# Patient Record
Sex: MALE | Race: OTHER | HISPANIC OR LATINO | URBAN - METROPOLITAN AREA
[De-identification: names, ages, dates, MRNs, and addresses within clinical notes are randomized per-mention and may not be internally consistent; named-entity substitution may affect disease eponyms.]

---

## 2023-03-18 ENCOUNTER — EMERGENCY (EMERGENCY)
Facility: HOSPITAL | Age: 48
LOS: 1 days | Discharge: HOME | End: 2023-03-18
Attending: EMERGENCY MEDICINE | Admitting: EMERGENCY MEDICINE
Payer: MEDICAID

## 2023-03-18 ENCOUNTER — EMERGENCY (EMERGENCY)
Facility: HOSPITAL | Age: 48
LOS: 1 days | Discharge: HOME | End: 2023-03-18
Attending: INTERNAL MEDICINE | Admitting: INTERNAL MEDICINE
Payer: MEDICAID

## 2023-03-18 VITALS
DIASTOLIC BLOOD PRESSURE: 128 MMHG | SYSTOLIC BLOOD PRESSURE: 221 MMHG | WEIGHT: 179.9 LBS | HEART RATE: 119 BPM | HEIGHT: 66 IN | RESPIRATION RATE: 18 BRPM | OXYGEN SATURATION: 99 % | TEMPERATURE: 98 F

## 2023-03-18 VITALS
RESPIRATION RATE: 15 BRPM | OXYGEN SATURATION: 98 % | DIASTOLIC BLOOD PRESSURE: 109 MMHG | HEART RATE: 90 BPM | SYSTOLIC BLOOD PRESSURE: 167 MMHG

## 2023-03-18 VITALS
OXYGEN SATURATION: 97 % | HEIGHT: 66 IN | HEART RATE: 92 BPM | WEIGHT: 179.9 LBS | RESPIRATION RATE: 19 BRPM | SYSTOLIC BLOOD PRESSURE: 200 MMHG | DIASTOLIC BLOOD PRESSURE: 135 MMHG | TEMPERATURE: 98 F

## 2023-03-18 LAB
ALBUMIN SERPL ELPH-MCNC: 4.3 G/DL — SIGNIFICANT CHANGE UP (ref 3.3–5)
ALP SERPL-CCNC: 117 U/L — SIGNIFICANT CHANGE UP (ref 40–120)
ALT FLD-CCNC: 29 U/L — SIGNIFICANT CHANGE UP (ref 10–45)
ANION GAP SERPL CALC-SCNC: 10 MMOL/L — SIGNIFICANT CHANGE UP (ref 5–17)
AST SERPL-CCNC: 34 U/L — SIGNIFICANT CHANGE UP (ref 10–40)
BASOPHILS # BLD AUTO: 0.07 K/UL — SIGNIFICANT CHANGE UP (ref 0–0.2)
BASOPHILS NFR BLD AUTO: 0.7 % — SIGNIFICANT CHANGE UP (ref 0–2)
BILIRUB SERPL-MCNC: 0.6 MG/DL — SIGNIFICANT CHANGE UP (ref 0.2–1.2)
BUN SERPL-MCNC: 7 MG/DL — SIGNIFICANT CHANGE UP (ref 7–23)
CALCIUM SERPL-MCNC: 9 MG/DL — SIGNIFICANT CHANGE UP (ref 8.4–10.5)
CHLORIDE SERPL-SCNC: 101 MMOL/L — SIGNIFICANT CHANGE UP (ref 96–108)
CO2 SERPL-SCNC: 29 MMOL/L — SIGNIFICANT CHANGE UP (ref 22–31)
CREAT SERPL-MCNC: 1.05 MG/DL — SIGNIFICANT CHANGE UP (ref 0.5–1.3)
EGFR: 87 ML/MIN/1.73M2 — SIGNIFICANT CHANGE UP
EOSINOPHIL # BLD AUTO: 0.02 K/UL — SIGNIFICANT CHANGE UP (ref 0–0.5)
EOSINOPHIL NFR BLD AUTO: 0.2 % — SIGNIFICANT CHANGE UP (ref 0–6)
GLUCOSE SERPL-MCNC: 119 MG/DL — HIGH (ref 70–99)
HCT VFR BLD CALC: 43.9 % — SIGNIFICANT CHANGE UP (ref 39–50)
HGB BLD-MCNC: 15.8 G/DL — SIGNIFICANT CHANGE UP (ref 13–17)
IMM GRANULOCYTES NFR BLD AUTO: 0.4 % — SIGNIFICANT CHANGE UP (ref 0–0.9)
LYMPHOCYTES # BLD AUTO: 1.74 K/UL — SIGNIFICANT CHANGE UP (ref 1–3.3)
LYMPHOCYTES # BLD AUTO: 17.4 % — SIGNIFICANT CHANGE UP (ref 13–44)
MCHC RBC-ENTMCNC: 31.2 PG — SIGNIFICANT CHANGE UP (ref 27–34)
MCHC RBC-ENTMCNC: 36 GM/DL — SIGNIFICANT CHANGE UP (ref 32–36)
MCV RBC AUTO: 86.6 FL — SIGNIFICANT CHANGE UP (ref 80–100)
MONOCYTES # BLD AUTO: 0.52 K/UL — SIGNIFICANT CHANGE UP (ref 0–0.9)
MONOCYTES NFR BLD AUTO: 5.2 % — SIGNIFICANT CHANGE UP (ref 2–14)
NEUTROPHILS # BLD AUTO: 7.63 K/UL — HIGH (ref 1.8–7.4)
NEUTROPHILS NFR BLD AUTO: 76.1 % — SIGNIFICANT CHANGE UP (ref 43–77)
NRBC # BLD: 0 /100 WBCS — SIGNIFICANT CHANGE UP (ref 0–0)
PLATELET # BLD AUTO: 317 K/UL — SIGNIFICANT CHANGE UP (ref 150–400)
POTASSIUM SERPL-MCNC: 3.2 MMOL/L — LOW (ref 3.5–5.3)
POTASSIUM SERPL-SCNC: 3.2 MMOL/L — LOW (ref 3.5–5.3)
PROT SERPL-MCNC: 8.4 G/DL — HIGH (ref 6–8.3)
RBC # BLD: 5.07 M/UL — SIGNIFICANT CHANGE UP (ref 4.2–5.8)
RBC # FLD: 13 % — SIGNIFICANT CHANGE UP (ref 10.3–14.5)
SODIUM SERPL-SCNC: 140 MMOL/L — SIGNIFICANT CHANGE UP (ref 135–145)
TROPONIN I, HIGH SENSITIVITY RESULT: 23.9 NG/L — SIGNIFICANT CHANGE UP
TROPONIN I, HIGH SENSITIVITY RESULT: 29.2 NG/L — SIGNIFICANT CHANGE UP
WBC # BLD: 10.02 K/UL — SIGNIFICANT CHANGE UP (ref 3.8–10.5)
WBC # FLD AUTO: 10.02 K/UL — SIGNIFICANT CHANGE UP (ref 3.8–10.5)

## 2023-03-18 PROCEDURE — 96374 THER/PROPH/DIAG INJ IV PUSH: CPT

## 2023-03-18 PROCEDURE — 36415 COLL VENOUS BLD VENIPUNCTURE: CPT

## 2023-03-18 PROCEDURE — 93010 ELECTROCARDIOGRAM REPORT: CPT

## 2023-03-18 PROCEDURE — 99053 MED SERV 10PM-8AM 24 HR FAC: CPT

## 2023-03-18 PROCEDURE — 85025 COMPLETE CBC W/AUTO DIFF WBC: CPT

## 2023-03-18 PROCEDURE — 93005 ELECTROCARDIOGRAM TRACING: CPT

## 2023-03-18 PROCEDURE — 84484 ASSAY OF TROPONIN QUANT: CPT

## 2023-03-18 PROCEDURE — 96376 TX/PRO/DX INJ SAME DRUG ADON: CPT

## 2023-03-18 PROCEDURE — 99284 EMERGENCY DEPT VISIT MOD MDM: CPT | Mod: 25

## 2023-03-18 PROCEDURE — 99284 EMERGENCY DEPT VISIT MOD MDM: CPT

## 2023-03-18 PROCEDURE — 99285 EMERGENCY DEPT VISIT HI MDM: CPT

## 2023-03-18 PROCEDURE — 80053 COMPREHEN METABOLIC PANEL: CPT

## 2023-03-18 RX ORDER — AMLODIPINE BESYLATE 2.5 MG/1
5 TABLET ORAL ONCE
Refills: 0 | Status: COMPLETED | OUTPATIENT
Start: 2023-03-18 | End: 2023-03-18

## 2023-03-18 RX ORDER — AMLODIPINE BESYLATE 2.5 MG/1
1 TABLET ORAL
Qty: 30 | Refills: 0
Start: 2023-03-18 | End: 2023-04-16

## 2023-03-18 RX ORDER — LABETALOL HCL 100 MG
10 TABLET ORAL ONCE
Refills: 0 | Status: COMPLETED | OUTPATIENT
Start: 2023-03-18 | End: 2023-03-18

## 2023-03-18 RX ADMIN — Medication 10 MILLIGRAM(S): at 15:18

## 2023-03-18 RX ADMIN — Medication 10 MILLIGRAM(S): at 16:17

## 2023-03-18 RX ADMIN — AMLODIPINE BESYLATE 5 MILLIGRAM(S): 2.5 TABLET ORAL at 19:13

## 2023-03-18 NOTE — ED ADULT NURSE NOTE - OBJECTIVE STATEMENT
Assumed pt care for a 47 male Bulgarian speaking alert and oriented x3 complaining of nose bleed and slight headache. Pt reports he was here in the ED earlier today for the same problem and was told his blood pressure is high given medications and they stopped the nose bleed. However pt reports he began to bleed again. Pt noted to be bleeding form left nostril, and noted to be hypertensive. MD made aware. IV established. Guaze given for bleeding. Awaiting further disposition.

## 2023-03-18 NOTE — ED ADULT NURSE NOTE - NS PRO AD NO ADVANCE DIRECTIVE
No Body Location Override (Optional - Billing Will Still Be Based On Selected Body Map Location If Applicable): right lateral upper back Detail Level: Detailed Add 33031 Cpt? (Important Note: In 2017 The Use Of 92777 Is Being Tracked By Cms To Determine Future Global Period Reimbursement For Global Periods): yes

## 2023-03-18 NOTE — ED PROVIDER NOTE - NOSE FINDINGS
scant dried blood and large coagulated clump of clotted blood. Once removed, no active bleeding or visible source of bleeding identified, no septal hematoma./EPISTAXIS

## 2023-03-18 NOTE — ED PROVIDER NOTE - NSFOLLOWUPCLINICS_GEN_ALL_ED_FT
Family Practice Clinic  Family Medicine  [address] [place] [id number]  Phone: [phone]  Fax:

## 2023-03-18 NOTE — ED PROVIDER NOTE - PATIENT PORTAL LINK FT
You can access the FollowMyHealth Patient Portal offered by [facility] by registering at the following website: [email or web address]. By joining [facility]’s FollowMyHealth portal, you will also be able to view your health information using other applications (apps) compatible with our system.

## 2023-03-18 NOTE — ED PROVIDER NOTE - NSFOLLOWUPINSTRUCTIONS_ED_ALL_ED_FT
Janette un seguimiento con la clínica de medicina familiar para establecer la atención lani paciente nuevo y tratar randhawa presión arterial deandra en la rg de emergencias hoy. Seguimiento con un otorrinolaringólogo según las indicaciones de la clínica de medicina familiar.  [place] los medicamentos según lo recetado para randhawa presión arterial.  Regrese a la rg de emergencias si tiene alguna inquietud.      Follow-up with the family practice clinic to establish care as a new patient and to address your high blood pressure in the emergency room today.  Follow-up with an ENT as directed by [facility].  Take medication as prescribed for your blood pressure.  Return to emergency room for any concerns.      Hemorragia nasal en los adultos    Nosebleed, Adult      Cuando hay hemorragia nasal, sale augustine de la nariz. Las hemorragias nasales son frecuentes. Por lo general, no son un signo de jennifer afección grave.    Puede trudy jennifer hemorragia nasal cuando un vaso sanguíneo de la nariz comienza a sangrar o si el recubrimiento de la nariz (membrana mucosa) se agrieta. Las causas frecuentes de las hemorragias nasales pueden ser las siguientes:  •Alergias.      •Resfríos.      •Hurgarse la nariz.      •Sonarse la nariz con demasiada intensidad.      •Jennifer lesión por meterse un objeto en la nariz o recibir un golpe en la nariz.      •Aire seco o frío.      Algunas causas menos frecuentes de las hemorragias nasales incluyen lo siguiente:  •Gases tóxicos.      •Algo anormal en la nariz o en los espacios llenos de aire en los huesos de la yassine (senos).      •Crecimientos en la nariz, lani pólipos.      •Anticoagulantes o afecciones que retrasan la coagulación de la augustine.      •Ciertas enfermedades o procedimientos que irritan o secan las fosas nasales.        Siga estas instrucciones en randhawa casa:      Si tiene jennifer hemorragia nasal:      •Siéntese e incline la carlene ligeramente hacia adelante.      •Utilice jennifer toalla o un pañuelo de papel limpios para apretar los orificios nasales debajo de la parte ósea de la nariz. Después de 5 minutos, suelte la nariz y compruebe si vuelve a sangrar. No quite la presión antes de brittni tiempo. Si aún hay hemorragia, repita la presión y sosténgala kira 5 minutos o hasta que la hemorragia se detenga.      • No coloque pañuelos de papel ni gasa en la nariz para detener la hemorragia.      •Evite recostarse e inclinar la carlene hacia atrás. Eso puede provocar jennifer acumulación de augustine en la garganta y producir ahogo o tos.      •Use un aerosol nasal descongestivo para aliviar jennifer hemorragia nasal lani se lo haya indicado el médico.      Después de jennifer hemorragia nasal:     •Evite sonarse la nariz u olfatear kira unas cuantas horas.      •No janette esfuerzos, no levante objetos y no doble la cintura para agacharse kira varios días. Puede volver a realizar otras actividades normales tan pronto lani pueda.    •Si está tomando aspirina o anticoagulantes y tiene hemorragias nasales, hable con randhawa médico. Estos medicamentos pueden favorecer el sangrado.  •Pregúntele al médico si debe dejar de zonia los medicamentos o si debe ajustar la dosis.      •No deje de zonia los medicamentos que el médico le ha recomendado, excepto si se deidra le indica que deje de tomarlos.      •Si la hemorragia nasal se debe a la sequedad de las membranas mucosas, use un gel o aerosol nasal de solución salina de venta satinder y un humidificador lani se lo haya indicado el médico. [place] mantendrá las mucosas húmedas y permitirá que se cicatricen. Si necesita usar jacqueline de estos productos:  •Elija jacqueline que sea soluble en agua.      •Use solamente la cantidad que necesita y con la frecuencia necesaria.      •No se acueste inmediatamente después de usarlo.      •Si tiene hemorragias nasales con frecuencia, hable con el médico sobre los tratamientos médicos. Las opciones pueden incluir lo siguiente:  •Cauterización nasal. Deidra tratamiento detiene y previene las hemorragias nasales mediante el uso de un hisopo con jennifer sustancia química o un dispositivo eléctrico con el que se queman ligeramente los vasos sanguíneos diminutos que están dentro de la nariz.      •Taponamiento nasal. Se coloca jennifer gasa u otro material en la nariz para ejercer presión dick sobre la smiley de la hemorragia.          Comuníquese con un médico si:    •Tiene fiebre.      •Tiene hemorragias nasales frecuentes o con mayor frecuencia que lo habitual.      •Presenta moretones con mucha facilidad.      •Tiene jennifer hemorragia nasal por tener algo metido en la nariz.      •Tiene sangrado en la boca.      •Vomita o libera jennifer sustancia marrón al toser.      •Tiene jennifer hemorragia nasal después de comenzar un medicamento nuevo.        Solicite ayuda de inmediato si:    •Tiene jennifer hemorragia nasal después de jennifer caída o jennifer lesión en la carlene.      •Tiene jennifer hemorragia nasal que no desaparece después de 20 minutos.      •Se siente mareado o débil.      •Tiene hemorragias fuera de lo común en otras partes del cuerpo.      •Tiene moretones fuera de lo común en otras partes del cuerpo.      •Tiene sudoración.      •Vomita augustine.        Resumen    •Cuando hay hemorragia nasal, sale augustine de la nariz. Las causas más frecuentes incluyen alergias, jennifer lesión en la nariz o aire frío o seco.      •El tratamiento inicial incluye aplicar presión kira 5 minutos.       •Humectar la nariz con gel o aerosol nasal con solución salina después de jennifer hemorragia nasal puede ayudar a prevenir futuras hemorragias.      •Busque ayuda de inmediato si la hemorragia nasal no desaparece después de 20 minutos.    Hipertensión en los adultos    Hypertension, Adult      La presión arterial deandra (hipertensión) se produce cuando la fuerza de la augustine bombea a través de las arterias con mucha fuerza. Las arterias son los vasos sanguíneos que transportan la augustine desde el corazón al be del cuerpo. La hipertensión hace que el corazón janette más esfuerzo para bombear augustine y puede provocar que las arterias se estrechen o endurezcan. La hipertensión no tratada o no controlada puede causar infarto de miocardio, insuficiencia cardíaca, accidente cerebrovascular, enfermedad renal y otros problemas.    Jennifer lectura de la presión arterial consta de un número más alto sobre un número más bajo. En condiciones ideales, la presión arterial debe estar por debajo de 120/80. El primer número (“superior”) es la presión sistólica. Es la medida de la presión de las arterias cuando el corazón late. El leno número (“inferior”) es la presión diastólica. Es la medida de la presión en las arterias cuando el corazón se relaja.      ¿Cuáles son las causas?    Se desconoce la causa exacta de esta afección. Hay algunas afecciones que causan presión arterial deandra.      ¿Qué incrementa el riesgo?    Ciertos factores pueden hacer que jennifer persona sea más propensa a desarrollar presión arterial deandra. Algunos de estos factores de riesgo están bajo randhawa control, por ejemplo, los siguientes:  •Fumar.      •No hacer la cantidad suficiente de actividad física o ejercicio.      •Tener sobrepeso.      •Consumir mucha grasa, azúcar, calorías o sal (sodio) en randhawa dieta.      •Beber alcohol en exceso.      Otros factores de riesgo son los siguientes:  •Tener antecedentes personales de enfermedad cardíaca, diabetes, colesterol alto o enfermedad renal.      •Estrés.      •Tener antecedentes familiares de presión arterial deandra y colesterol alto.      •Tener apnea obstructiva del sueño.      •Edad. El riesgo aumenta con la edad.        ¿Cuáles son los signos o síntomas?    Es posible que la presión arterial deandra no cause síntomas. La presión arterial muy deandra (crisis hipertensiva) puede provocar:  •Dolor de carlene.      •Latidos cardíacos acelerados o irregulares (palpitaciones).      •Falta de aire.      •Hemorragia nasal.      •Náuseas y vómitos.      •Cambios en la visión.      •Dolor intenso en el pecho, mareos y convulsiones.        ¿Cómo se diagnostica?    Esta afección se diagnostica al medir randhawa presión arterial mientras se encuentra sentado, con el brazo apoyado sobre jennifer superficie plana, las piernas sin cruzar y los pies rosangela apoyados en el piso. El brazalete del tensiómetro debe colocarse directamente sobre la piel de la parte superior del brazo y al nivel de randhawa corazón. La presión arterial debe medirse al menos dos veces en el mismo brazo. Determinadas condiciones pueden causar jennifer diferencia de presión arterial entre el brazo dc y el derecho.    Si tiene jennifer lectura de presión arterial deandra kira jennifer visita o si tiene presión arterial normal con otros factores de riesgo, se le podrá pedir que janette lo siguiente:  •Que regrese otro día para volver a controlar randhawa presión arterial nuevamente.      •Que se controle la presión arterial en randhawa casa kira 1 semana o más.      Si se le diagnostica hipertensión, es posible que se le realicen otros análisis de augustine o estudios de diagnóstico por imágenes para ayudar a randhawa médico a comprender randhawa riesgo general de tener otras afecciones.      ¿Cómo se trata?    Esta afección se trata haciendo cambios saludables en el estilo de teresa, tales lani ingerir alimentos saludables, realizar más ejercicio y reducir el consumo de alcohol. Es posible que lo deriven para que reciba asesoramiento sobre jennifer dieta saludable y actividad física.    El médico puede recetarle medicamentos si los cambios en el estilo de teresa no son suficientes para lograr controlar la presión arterial y si:  •[place] presión arterial sistólica está por encima de 130.      •[place] presión arterial diastólica está por encima de 80.      La presión arterial deseada puede variar en función de las enfermedades, la edad y otros factores personales.      Siga estas instrucciones en randhawa casa:      Comida y bebida   A plate with examples of foods in a healthy diet. •Siga jennifer dieta con alto contenido de fibras y potasio, y con bajo contenido de sodio, azúcar agregada y grasas. Un ejemplo de deidra plan de alimentación se denomina dieta DASH. DASH es la sigla en inglés de “Enfoques alimentarios para detener la hipertensión”. Para alimentarse de esta manera:  •Coma mucha fruta y verdura fresca. Trate de que la mitad del plato de cada comida sea de frutas y verduras.      •Coma cereales integrales, lani pasta integral, arroz integral o pan integral. Llene aproximadamente un cuarto del plato con cereales integrales.      •Coma y rachel productos lácteos con bajo contenido de grasa, lani leche descremada o yogur bajo en grasas.      •Evite la ingesta de thomson de carne grasa, carne procesada o curada, y carne de ave con piel. Llene aproximadamente un cuarto del plato con proteínas magras, lani pescado, clara sin piel, frijoles, huevos o tofu.    •Evite ingerir alimentos prehechos y procesados. En general, estos tienen mayor cantidad de sodio, azúcar agregada y grasa.      •Reduzca randhaaw ingesta diaria de sodio. Muchas personas que tienen hipertensión deben comer menos de 1,500 mg de sodio por día.    • No rachel alcohol si:  •Randhawa médico le indica no hacerlo.    •Está embarazada, puede estar embarazada o está tratando de quedar embarazada.      •Si manuel alcohol:•Limite la cantidad que manuel a lo siguiente:  •De 0 a 1 medida por día para las mujeres.    •De 0 a 2 medidas por día para los hombres.      •Sepa cuánta cantidad de alcohol hay en las bebidas que jun. En los [place], jennifer medida equivale a jennifer botella de cerveza de 12 oz (355 ml), un vaso de vino de 5 oz (148 ml) o un vaso de jennifer bebida alcohólica de deandra graduación de 1½ oz (44 ml).        Estilo de teresa   A blood pressure monitor and cuff.    •Trabaje con randhawa médico para mantener un peso saludable o perder peso. Pregúntele cuál es el peso recomendado para usted.    •Realice al menos 30 minutos de ejercicio que janette que se acelere randhawa corazón (ejercicio aeróbico) la mayoría de los días de la semana. Estas actividades pueden incluir caminar, nadar o andar en bicicleta.    •Incluya ejercicios para fortal[place]er mirna músculos (ejercicios de resistencia), lani [place] o levantamiento de pesas, lani parte de randhawa rutina semanal de ejercicios. Intente realizar 30 minutos de deidra tipo de ejercicios al menos jackelyn días a la semana.      • No consuma ningún producto que contenga nicotina o tabaco. Estos productos incluyen cigarrillos, tabaco para mascar y aparatos de vapeo, lani los cigarrillos electrónicos. Si necesita ayuda para dejar de fumar, consulte al médico.    •Contrólese la presión arterial en randhawa casa según las indicaciones del médico.    •Concurra a todas las visitas de seguimiento. [place] es importante.    Medicamentos     •Use los medicamentos de venta satinder y los recetados solamente lani se lo haya indicado el médico. Siga cuidadosamente las indicaciones. Los medicamentos para la presión arterial deben tomarse según las indicaciones.      • No omita las dosis de medicamentos para la presión arterial. Si lo hace, estará en riesgo de tener problemas y puede hacer que los medicamentos henry menos eficaces.    •Pregúntele a randhawa médico a qué efectos secundarios o reacciones a los medicamentos debe prestar atención.      Comuníquese con un médico si:    •Piensa que tiene jennifer reacción a un medicamento que está tomando.    •Tiene jc de carlene frecuentes (recurrentes).    •Se siente mareado.    •Tiene hinchazón en los tobillos.    •Tiene problemas de visión.      Solicite ayuda inmediatamente si:    •Siente un dolor de carlene intenso o confusión.    •Siente debilidad inusual o adormecimiento.    •Siente que va a desmayarse.    •Siente un dolor intenso en el pecho o el abdomen.    •Vomita repetidas veces.    •Tiene dificultad para respirar.    Estos síntomas pueden indicar jennifer emergencia. Solicite ayuda de inmediato. Llame al 911.   • No espere a marian si los síntomas desaparecen.     • No conduzca por mirna propios medios hasta el hospital.       Resumen    •La hipertensión se produce cuando la augustine bombea en las arterias con mucha fuerza. Si esta afección no se controla, podría correr riesgo de tener complicaciones graves.      •La presión arterial deseada puede variar en función de las enfermedades, la edad y otros factores personales. Para la mayoría de las personas, jennifer presión arterial normal es aydin que 120/80.      •La hipertensión se trata con cambios en el estilo de teresa, medicamentos o jennifer combinación de ambos. Los cambios en el estilo de treesa incluyen pérdida de peso, ingerir alimentos sanos, seguir jennifer dieta baja en sodio, hacer más ejercicio y limitar el consumo de alcohol.

## 2023-03-18 NOTE — ED PROVIDER NOTE - ATTENDING CONTRIBUTION TO CARE
46 yo male with no known medical problems, no medications, no PMD, and denies having seen a doctor in 20+ years, complaining of left-sided nosebleeding that started at approx. 7 am, resolved, then started to bleed again just prior to presentation in the ED.  Patient denies any direct trauma, falls or in injuries, reports some nasal congestion and non-productive cough x approx. 1 week, denies any chest pain, SOB, Fevers, chills, headache, or any other complaint.     Hypertensive in the ED.  Epistaxis resolved.     CBC, CMP, ECG, Trop, CXR, re-assess.    Patient denied any complaint for duration of ED stay. Given no PMD or care, started Norvasc 5mg PO qDaily.   [facility], ENT Follow up.  Social Work aware, put in Book for assistance with FP follow-up.  Dr. Courtney:  I have reviewed and discussed with the PA/ resident the case specifics, including the history, physical assessment, evaluation, conclusion, laboratory results, and medical plan. I agree with the contents, and conclusions. I have personally examined, and interviewed the patient.

## 2023-03-18 NOTE — ED PROVIDER NOTE - CLINICAL SUMMARY MEDICAL DECISION MAKING FREE TEXT BOX
Hypertensive in the ED.  [facility], ENT Follow up Hypertensive in the ED.    Epistaxis resolved.     Given no PMD or care, started Norvasc 5mg PO qD.  [facility] Clinic, ENT Follow up.  Social Work aware, put in Book for assistance with FP follow-up. 46 yo male with no known medical problems, no medications, no PMD, and denies having seen a doctor in 20+ years, complaining of left-sided nosebleeding that started at approx. 7 am, resolved, then started to bleed again just prior to presentation in the ED.  Patient denies any direct trauma, falls or in injuries, reports some nasal congestion and non-productive cough x approx. 1 week, denies any chest pain, SOB, Fevers, chills, headache, or any other complaint.     Hypertensive in the ED.  Epistaxis resolved.     CBC, CMP, ECG, Trop, CXR, re-assess.    Patient denied any complaint for duration of ED stay. Given no PMD or care, started Norvasc 5mg PO qDaily.   [facility] Clinic, ENT Follow up.  Social Work aware, put in Book for assistance with FP follow-up.

## 2023-03-18 NOTE — ED PROVIDER NOTE - NS ED ATTENDING STATEMENT MOD
I have seen and examined this patient and fully participated in the care of this patient as the teaching attending.  The service was shared with the AIMEE.  I reviewed and verified the documentation and independently performed the documented:

## 2023-03-18 NOTE — ED ADULT TRIAGE NOTE - CHIEF COMPLAINT QUOTE
Patient present with nose bleed. Patient denies chest pain, SOB, dizziness and blurry vision. Patient c/o headache 3/10

## 2023-03-18 NOTE — ED ADULT NURSE REASSESSMENT NOTE - NS ED NURSE REASSESS COMMENT FT1
MD hebert and HUNTER sheffield aware of bp, patient asymptomatic at this time. Patient without nose bleed. Okay for patient to be DC as per MD.

## 2023-03-18 NOTE — ED ADULT NURSE NOTE - NSIMPLEMENTINTERV_GEN_ALL_ED
Implemented All Universal Safety Interventions:  [place] to call system. Call bell, personal items and telephone within reach. Instruct patient to call for assistance. Room bathroom lighting operational. Non-slip footwear when patient is off stretcher. Physically safe environment: no spills, clutter or unnecessary equipment. Stretcher in lowest position, wheels locked, appropriate side rails in place.

## 2023-03-18 NOTE — ED ADULT NURSE NOTE - OBJECTIVE STATEMENT
left nostril bleeding today, spontaneous - BP noted and repeated-pt states he takes  no meds and has never been to a doctor

## 2023-03-18 NOTE — ED PROVIDER NOTE - OBJECTIVE STATEMENT
46 yo male with no known medical problems, no medications, no PMD, and denies having seen a doctor in 20+ years, complaining of left-sided nosebleeding that started at approx. 7 am, resolved, then started to bleed again just prior to presentation in the ED.  Patient denies any direct trauma, falls or in injuries, reports some nasal congestion and non-productive cough x approx. 1 week, denies any chest pain, SOB, Fevers, chills, headache, or any other complaint.

## 2023-03-18 NOTE — ED PROVIDER NOTE - CARE PROVIDER_API CALL
Matti Kingsley)  Otolaryngology  [address] [place] [address], [place] [address]  Phone: [phone]  Fax: [phone]  Follow Up Time:

## 2023-03-19 VITALS
RESPIRATION RATE: 18 BRPM | HEART RATE: 78 BPM | SYSTOLIC BLOOD PRESSURE: 149 MMHG | OXYGEN SATURATION: 98 % | DIASTOLIC BLOOD PRESSURE: 111 MMHG

## 2023-03-19 PROBLEM — Z78.9 OTHER SPECIFIED HEALTH STATUS: Chronic | Status: ACTIVE | Noted: 2023-03-18

## 2023-03-19 PROCEDURE — 96374 THER/PROPH/DIAG INJ IV PUSH: CPT

## 2023-03-19 PROCEDURE — 96376 TX/PRO/DX INJ SAME DRUG ADON: CPT

## 2023-03-19 PROCEDURE — 99284 EMERGENCY DEPT VISIT MOD MDM: CPT | Mod: 25

## 2023-03-19 RX ORDER — ACETAMINOPHEN 500 MG
650 TABLET ORAL ONCE
Refills: 0 | Status: COMPLETED | OUTPATIENT
Start: 2023-03-19 | End: 2023-03-19

## 2023-03-19 RX ORDER — LABETALOL HCL 100 MG
20 TABLET ORAL ONCE
Refills: 0 | Status: COMPLETED | OUTPATIENT
Start: 2023-03-19 | End: 2023-03-19

## 2023-03-19 RX ADMIN — Medication 20 MILLIGRAM(S): at 01:51

## 2023-03-19 RX ADMIN — Medication 650 MILLIGRAM(S): at 01:51

## 2023-03-19 RX ADMIN — Medication 650 MILLIGRAM(S): at 01:00

## 2023-03-19 RX ADMIN — Medication 20 MILLIGRAM(S): at 00:43

## 2023-03-19 NOTE — ED PROVIDER NOTE - CLINICAL SUMMARY MEDICAL DECISION MAKING FREE TEXT BOX
47-year-old male presents for nosebleed.  Patient was in the ED earlier for similar. Bleeding spontaneously resolved.  Hypertensive.  Started on Norvasc however did not  his medication since.  Comes back to the ER for return of epistaxis. No trauma.   Exam as stated.   Plan for rhinostat and BP control. 47-year-old male presents for nosebleed.  Patient was in the ED earlier for similar. Bleeding spontaneously resolved.  Hypertensive.  Started on Norvasc however did not  his medication since.  Comes back to the ER for return of epistaxis. No trauma.   Exam as stated.   Plan for rhinostat and BP control.  Blood pressure under control.  Epistaxis controlled.  Rhinostat in place.  We will send antibiotic. Earlier BP meds were sent.  Discussed with patient in German using  [id number] Isaac.

## 2023-03-19 NOTE — ED PROVIDER NOTE - NSFOLLOWUPCLINICS_GEN_ALL_ED_FT
Family Practice Clinic  Family Medicine  [address] [place] [id number]  Phone: [phone]  Fax:

## 2023-03-19 NOTE — ED PROVIDER NOTE - PATIENT PORTAL LINK FT
You can access the FollowMyHealth Patient Portal offered by [facility] by registering at the following website: [email or web address]. By joining [email or web address]’s FollowMyHealth portal, you will also be able to view your health information using other applications (apps) compatible with our system.

## 2023-03-19 NOTE — ED PROVIDER NOTE - OBJECTIVE STATEMENT
47-year-old male presents for nosebleed.  Patient was in the ED earlier for similar. Bleeding spontaneously resolved.  Hypertensive.  Started on Norvasc however did not  his medication since.  Comes back to the ER for return of epistaxis. No trauma.

## 2023-03-19 NOTE — ED PROVIDER NOTE - NSFOLLOWUPINSTRUCTIONS_ED_ALL_ED_FT
Hemorragia nasal en los adultos    Nosebleed, Adult      Cuando hay hemorragia nasal, sale augustine de la nariz. Las hemorragias nasales son frecuentes. Por lo general, no son un signo de jennifer afección grave.    Puede trudy jennifer hemorragia nasal cuando un vaso sanguíneo de la nariz comienza a sangrar o si el recubrimiento de la nariz (membrana mucosa) se agrieta. Las causas frecuentes de las hemorragias nasales pueden ser las siguientes:  •Alergias.      •Resfríos.      •Hurgarse la nariz.      •Sonarse la nariz con demasiada intensidad.      •Jennifer lesión por meterse un objeto en la nariz o recibir un golpe en la nariz.      •Aire seco o frío.      Algunas causas menos frecuentes de las hemorragias nasales incluyen lo siguiente:  •Gases tóxicos.      •Algo anormal en la nariz o en los espacios llenos de aire en los huesos de la yassine (senos).      •Crecimientos en la nariz, lani pólipos.      •Anticoagulantes o afecciones que retrasan la coagulación de la augustine.      •Ciertas enfermedades o procedimientos que irritan o secan las fosas nasales.        Siga estas instrucciones en randhawa casa:      Si tiene jennifer hemorragia nasal:      •Siéntese e incline la carlene ligeramente hacia adelante.      •Utilice jennifer toalla o un pañuelo de papel limpios para apretar los orificios nasales debajo de la parte ósea de la nariz. Después de 5 minutos, suelte la nariz y compruebe si vuelve a sangrar. No quite la presión antes de brittni tiempo. Si aún hay hemorragia, repita la presión y sosténgala kira 5 minutos o hasta que la hemorragia se detenga.      • No coloque pañuelos de papel ni gasa en la nariz para detener la hemorragia.      •Evite recostarse e inclinar la carlene hacia atrás. Eso puede provocar jennifer acumulación de augustine en la garganta y producir ahogo o tos.      •Use un aerosol nasal descongestivo para aliviar jennifer hemorragia nasal lani se lo haya indicado el médico.      Después de jennifer hemorragia nasal:     •Evite sonarse la nariz u olfatear kira unas cuantas horas.      •No janette esfuerzos, no levante objetos y no doble la cintura para agacharse kira varios días. Puede volver a realizar otras actividades normales tan pronto lani pueda.    •Si está tomando aspirina o anticoagulantes y tiene hemorragias nasales, hable con [place] médico. Estos medicamentos pueden favorecer el sangrado.  •Pregúntele al médico si debe dejar de zonia los medicamentos o si debe ajustar la dosis.      •No deje de zonia los medicamentos que el médico le ha recomendado, excepto si se lucinda le indica que deje de tomarlos.      •Si la hemorragia nasal se debe a la sequedad de las membranas mucosas, use un gel o aerosol nasal de solución salina de venta satinder y un humidificador lani se lo haya indicado el médico. [place] mantendrá las mucosas húmedas y permitirá que se cicatricen. Si necesita usar jacqueline de estos productos:  •Elija jacqueline que sea soluble en agua.      •Use solamente la cantidad que necesita y con la frecuencia necesaria.      •No se acueste inmediatamente después de usarlo.      •Si tiene hemorragias nasales con frecuencia, hable con el médico sobre los tratamientos médicos. Las opciones pueden incluir lo siguiente:  •Cauterización nasal. Lucinda tratamiento detiene y previene las hemorragias nasales mediante el uso de un hisopo con jennifer sustancia química o un dispositivo eléctrico con el que se queman ligeramente los vasos sanguíneos diminutos que están dentro de la nariz.      •Taponamiento nasal. Se coloca jennifer gasa u otro material en la nariz para ejercer presión dick sobre la smiley de la hemorragia.          Comuníquese con un médico si:    •Tiene fiebre.      •Tiene hemorragias nasales frecuentes o con mayor frecuencia que lo habitual.      •Presenta moretones con mucha facilidad.      •Tiene jennifer hemorragia nasal por tener algo metido en la nariz.      •Tiene sangrado en la boca.      •Vomita o libera jennifer sustancia marrón al toser.      •Tiene jennifer hemorragia nasal después de comenzar un medicamento nuevo.        Solicite ayuda de inmediato si:    •Tiene jennifer hemorragia nasal después de jennifer caída o jennifer lesión en la carlene.      •Tiene jennifer hemorragia nasal que no desaparece después de 20 minutos.      •Se siente mareado o débil.      •Tiene hemorragias fuera de lo común en otras partes del cuerpo.      •Tiene moretones fuera de lo común en otras partes del cuerpo.      •Tiene sudoración.      •Vomita augustine.        Resumen    •Cuando hay hemorragia nasal, sale augustine de la nariz. Las causas más frecuentes incluyen alergias, jennifer lesión en la nariz o aire frío o seco.      •El tratamiento inicial incluye aplicar presión kira 5 minutos.       •Humectar la nariz con gel o aerosol nasal con solución salina después de jennifer hemorragia nasal puede ayudar a prevenir futuras hemorragias.      •Busque ayuda de inmediato si la hemorragia nasal no desaparece después de 20 minutos.      Esta información no tiene lani fin reemplazar el consejo del médico. Asegúrese de hacerle al médico cualquier pregunta que tenga.

## 2023-03-21 ENCOUNTER — EMERGENCY (EMERGENCY)
Facility: HOSPITAL | Age: 48
LOS: 1 days | Discharge: HOME | End: 2023-03-21
Attending: INTERNAL MEDICINE | Admitting: INTERNAL MEDICINE
Payer: MEDICAID

## 2023-03-21 VITALS
SYSTOLIC BLOOD PRESSURE: 165 MMHG | HEART RATE: 107 BPM | RESPIRATION RATE: 18 BRPM | WEIGHT: 147.71 LBS | TEMPERATURE: 97 F | DIASTOLIC BLOOD PRESSURE: 121 MMHG | HEIGHT: 66 IN | OXYGEN SATURATION: 97 %

## 2023-03-21 VITALS
SYSTOLIC BLOOD PRESSURE: 162 MMHG | HEART RATE: 96 BPM | DIASTOLIC BLOOD PRESSURE: 103 MMHG | OXYGEN SATURATION: 98 % | RESPIRATION RATE: 18 BRPM

## 2023-03-21 PROCEDURE — 99283 EMERGENCY DEPT VISIT LOW MDM: CPT

## 2023-03-21 PROCEDURE — 99284 EMERGENCY DEPT VISIT MOD MDM: CPT

## 2023-03-21 RX ORDER — AMLODIPINE BESYLATE 2.5 MG/1
5 TABLET ORAL ONCE
Refills: 0 | Status: COMPLETED | OUTPATIENT
Start: 2023-03-21 | End: 2023-03-21

## 2023-03-21 RX ORDER — HYDROCHLOROTHIAZIDE 25 MG
1 TABLET ORAL
Qty: 30 | Refills: 0
Start: 2023-03-21 | End: 2023-04-19

## 2023-03-21 RX ADMIN — AMLODIPINE BESYLATE 5 MILLIGRAM(S): 2.5 TABLET ORAL at 09:34

## 2023-03-21 NOTE — ED PROVIDER NOTE - OBJECTIVE STATEMENT
47-year-old male recently diagnosed hypertension presents to the ED for nasal packing removal.  Patient was seen in the ED 3 days ago for hypertension with epistaxis, he was started on Norvasc 5mg which he reports compliance with.  No more epistaxis since the bleeding is controlled. Denies CP, SOB, HA or dizziness

## 2023-03-21 NOTE — ED PROVIDER NOTE - CLINICAL SUMMARY MEDICAL DECISION MAKING FREE TEXT BOX
47-year-old male recently diagnosed hypertension presents to the ED for nasal packing removal.  Patient was seen in the ED 3 days ago for hypertension with epistaxis, he was started on Norvasc 5mg which he reports compliance with.  No more epistaxis since the bleeding is controlled. Denies CP, SOB, HA or dizziness. PE as noted above. BP elevated, pt states he took the norvasc 5mg this morning. will give another norvasc 5mg and HCTZ 25mg 47-year-old male recently diagnosed hypertension presents to the ED for nasal packing removal.  Patient was seen in the ED 3 days ago for hypertension with epistaxis, he was started on Norvasc 5mg which he reports compliance with.  No more epistaxis since the bleeding is controlled. Denies CP, SOB, HA or dizziness. PE as noted above. BP elevated, pt states he took the norvasc 5mg this morning. will give another norvasc 5mg and HCTZ 25mg.    1054am:No epistaxis while in the ED.  Patient's blood pressure is improved.  Will DC with a prescription of hydrochlorothiazide 25 mg.  Patient instructed to take milligrams of Norvasc along with the hydrochlorothiazide daily.  Shruti got patient an appointment with family practice clinic for follow-up in 1 week

## 2023-03-21 NOTE — ED PROVIDER NOTE - NSFOLLOWUPINSTRUCTIONS_ED_ALL_ED_FT
Follow up with Family Practice Clinic as scheduled.     Take 2 tablets of your norvasc 5mg and 1 tablet of the Hydrochlorothiazide 25mg prescribed today every morning.     Stay hydrated    Return to the ER if your symptoms worsen or for any other medical emergencies  **********    Hipertensión en los adultos    Hypertension, Adult      El término hipertensión es otra forma de denominar a la presión arterial elevada. La presión arterial elevada fuerza al corazón a trabajar más para bombear la augustine. [place] puede causar problemas con el paso del tiempo.    Jennifer lectura de presión arterial está compuesta por 2 números. Hay un número superior (sistólico) sobre un número inferior (diastólico). Lo ideal es tener la presión arterial por debajo de 120/80.      ¿Cuáles son las causas?    Se desconoce la causa de esta afección. Algunas otras afecciones pueden provocar presión arterial elevada.      ¿Qué incrementa el riesgo?    Algunos factores del estilo de teresa pueden hacer que tenga más probabilidades de desarrollar presión arterial elevada:  •Fumar.      •No hacer la cantidad suficiente de actividad física o ejercicio.      •Tener sobrepeso.      •Consumir mucha grasa, azúcar, calorías o sal (sodio) en randhawa dieta.      •Beber alcohol en exceso.      Otros factores de riesgo son los siguientes:•Tener alguna de estas afecciones:  •Enfermedad cardíaca.      •Diabetes.       • Colesterol alto.      •Enfermedad renal.      •Apnea obstructiva del sueño.        •Tener antecedentes familiares de presión arterial elevada y colesterol elevado.      •Edad. El riesgo aumenta con la edad.      •Estrés.        ¿Cuáles son los signos o síntomas?    Es posible que la presión arterial deandra no cause síntomas. La presión arterial muy deandra (crisis hipertensiva) puede provocar:  •Dolor de carlene.      •Latidos cardíacos acelerados o irregulares (palpitaciones).      •Falta de aire.      •Hemorragia nasal.      •Vomitar o sentir ganas de vomitar (náuseas).      •Cambios en la forma de marian.      •Dolor muy intenso en el pecho.      •Sensación de mareo.      •Convulsiones.        ¿Cómo se trata?  •Esta afección se trata haciendo cambios saludables en el estilo de teresa, por ejemplo:  •Consumir alimentos saludables.      •Hacer más ejercicio.      •Beber menos alcohol.      •El médico puede recetarle medicamentos si los cambios en el estilo de teresa no son lo suficientemente eficaces y si:  •El número de arriba está por encima de 130.      •El número de abajo está por encima de 80.        •[place] presión arterial personal ideal puede variar.        Siga estas indicaciones en randhawa casa:      Comida y bebida   A plate with examples of foods in a healthy diet. •Si se lo dicen, siga el plan de alimentación de DASH (Dietary Approaches to Stop Hypertension, Maneras de alimentarse para detener la hipertensión). Para seguir lucinda plan:  •Llene la mitad del plato de cada comida con frutas y verduras.      •Llene un cuarto del plato de cada comida con cereales integrales. Los cereales integrales incluyen pasta integral, arroz integral y pan integral.      •Coma y rachel productos lácteos con bajo contenido de grasa, lani leche descremada o yogur bajo en grasas.      •Llene un cuarto del plato de cada comida con proteínas bajas en grasa (magras). Las proteínas bajas en grasa incluyen pescado, clara sin piel, huevos, frijoles y tofu.      •Evite consumir carne grasa, carne curada y procesada, o clara con piel.      •Evite consumir alimentos prehechos o procesados.        •Limite la cantidad de sal en [place] dieta a menos de 1500 mg por día.    • No rachel alcohol si:  •El médico le indica que no lo janette.      •Está embarazada, puede estar embarazada o está tratando de quedar embarazada.      •Si manuel alcohol:•Limite la cantidad que manuel a lo siguiente:  •De 0 a 1 medida por día para las mujeres.      •De 0 a 2 medidas por día para los hombres.        •Sepa cuánta cantidad de alcohol hay en las bebidas que jun. En los [place], jennifer medida equivale a jennifer botella de cerveza de 12 oz (355 ml), un vaso de vino de 5 oz (148 ml) o un vaso de jennifer bebida alcohólica de deandra graduación de 1½ oz (44 ml).          Estilo de teresa   A blood pressure monitor and cuff.    •Trabaje con [place] médico para mantenerse en un peso saludable o para perder peso. Pregúntele a [place] médico cuál es el peso recomendable para usted.      •Realice al menos 30 minutos de ejercicio que janette que se acelere rand[place] corazón (ejercicio aeróbico) la mayoría de los días de la semana. Estos pueden incluir caminar, nadar o andar en bicicleta.      •Realice al menos 30 minutos de ejercicio que fortalezca mirna músculos (ejercicios de resistencia) al menos 3 días a la semana. Estos pueden incluir levantar pesas o hacer [place].      • No fume ni consuma ningún producto que contenga nicotina o tabaco. Si necesita ayuda para dejar de consumir estos productos, consulte al médico.      •Controle [place] presión arterial en randhawa casa brad lani le indicó el médico.      •Concurra a todas las visitas de seguimiento.      Medicamentos     •Use los medicamentos de venta satinder y los recetados solamente lani se lo haya indicado el médico. Siga cuidadosamente las indicaciones.      • No omita las dosis de medicamentos para la presión arterial. Los medicamentos pierden eficacia si omite dosis. El hecho de omitir las dosis también aumenta el riesgo de otros problemas.      •Pregúntele a [place] médico a qué efectos secundarios o reacciones a los medicamentos debe prestar atención.        Comuníquese con un médico si:    •Piensa que tiene jennifer reacción a los medicamentos que está tomando.      •Tiene jc de carlene frecuentes.      •Siente mareos.      •Tiene hinchazón en los tobillos.      •Tiene problemas de visión.        Solicite ayuda de inmediato si:    •Siente un dolor de carlene muy intenso.      •Empieza a sentirse desorientado (confundido).      •Se siente débil o adormecido.      •Siente que va a desmayarse.    •Tiene un dolor muy intenso en:  •Pecho.      •Vientre (abdomen).        •Vomita más de jennifer vez.      •Tiene dificultad para respirar.      Estos síntomas pueden indicar jennifer emergencia. Solicite ayuda de inmediato. Llame al 911.   • No espere a marian si los síntomas desaparecen.        •  No conduzca por mirna propios medios hasta el hospital.         Resumen    •El término hipertensión es otra forma de denominar a la presión arterial elevada.      •La presión arterial elevada fuerza al corazón a trabajar más para bombear la augustine.      •Para la mayoría de las personas, jennifer presión arterial normal es aydin que 120/80.      •Las decisiones saludables pueden ayudarle a disminuir [place] presión arterial. Si no puede bajar randhawa presión arterial mediante decisiones saludables, es posible que deba zonia medicamentos.      Esta información no tiene lani fin reemplazar el consejo del médico. Asegúrese de hacerle al médico cualquier pregunta que tenga.

## 2023-03-21 NOTE — ED PROVIDER NOTE - PATIENT PORTAL LINK FT
You can access the FollowMyHealth Patient Portal offered by [facility] by registering at the following website: [email or web address]. By joining [facility]’s FollowMyHealth portal, you will also be able to view your health information using other applications (apps) compatible with our system.

## 2023-03-21 NOTE — ED PROVIDER NOTE - ATTENDING APP SHARED VISIT CONTRIBUTION OF CARE
NURSE ANTICOAGULATION VISIT    Josiah Carnes 1964 presents to clinic today for 1/2 week INR appointment    No outpatient prescriptions have been marked as taking for the 6/23/17 encounter (Anti-Coag) with [facility].       INR (no units)   Date Value   06/17/2017 1.2     INR-POC (no units)   Date Value   06/23/2017 1.5 (A)        GOAL RANGE: 2.0-3.0    ALLERGIES:  No Known Allergies    Patient Active Problem List   Diagnosis   • Persistent atrial fibrillation (CMS/HCC)   • Edema of both legs   • Acute congestive heart failure, unspecified congestive heart failure type (CMS/HCC)   • Encounter for therapeutic drug monitoring   • Long term (current) use of anticoagulants   • Morbid obesity with BMI of 40.0-44.9, adult (CMS/HCC)       PATIENT REPORTED CHANGES: No changes with medications/diet or concerns currently per patient.      NURSE DOSING ADJUSTMENTS AND EDUCATION: Dose increased and will finish lovenox 150mg daily 6/23 and 6/24   New dosage sheet provided.        Patient will recheck INR on 6/26, sooner if changes or concerns develop.  Warfarin Management done via face to face.  Reviewed signs and symptoms of bleeding and clotting with patient.  Reviewed and reinforced with patient the importance of calling the clinic with any medication, diet, and health related changes.  Importance of adherence to consistent diet in Vitamin K reviewed.  Affects of alcohol consumption and with concurrent use of Warfarin explained to patient.  Patient verbalized understanding.  Sent to Dr. Dean for review and signature.      Yareli Su RN   47-year-old male recently diagnosed hypertension presents to the ED for nasal packing removal.  Patient was seen in the ED 3 days ago for hypertension with epistaxis, he was started on Norvasc 5mg which he reports compliance with.  No more epistaxis since the bleeding is controlled. Denies CP, SOB, HA or dizziness. PE as noted above. BP elevated, pt states he took the norvasc 5mg this morning. will give another norvasc 5mg and HCTZ 25mg.    1054am:No epistaxis while in the ED.  Patient's blood pressure is improved.  Will DC with a prescription of hydrochlorothiazide 25 mg.  Patient instructed to take milligrams of Norvasc along with the hydrochlorothiazide daily.  Crystal got patient an appointment with family practice clinic for follow-up in 1 week  Dr. Courtney:  I have reviewed and discussed with the PA/ resident the case specifics, including the history, physical assessment, evaluation, conclusion, laboratory results, and medical plan. I agree with the contents, and conclusions. I have personally examined, and interviewed the patient.

## 2023-03-21 NOTE — ED ADULT NURSE NOTE - OBJECTIVE STATEMENT
Assumed pt care for a 47 yr old male alert and oriented x 4 reports he was recently in the ED for a nose bleed and is here to remove the rhino rocket. Pt noted to be hypertensive in Triage. MD aware. Meds given as per orders. Pt Denies any further complaints. No neurological symptoms present. Awaiting further disposition.

## 2023-03-21 NOTE — ED PROVIDER NOTE - PHYSICAL EXAMINATION
Gen: Well appearing in NAD.    ENT: oral mucosa, +blood in the back to the throat.  No active epistaxis noted after packing was removed.   Head: atraumatic  Heart: s1/s2, RRR  Lung: CTA b/l,   Neuro: AAO x3  Skin: Normal for race.   Psych: Alert and oriented

## 2023-03-27 ENCOUNTER — APPOINTMENT (OUTPATIENT)
Dept: FAMILY MEDICINE | Facility: HOSPITAL | Age: 48
End: 2023-03-27

## 2023-03-27 ENCOUNTER — OUTPATIENT (OUTPATIENT)
Dept: OUTPATIENT SERVICES | Facility: HOSPITAL | Age: 48
LOS: 1 days | End: 2023-03-27
Payer: SELF-PAY

## 2023-03-27 VITALS
HEIGHT: 65 IN | BODY MASS INDEX: 29.82 KG/M2 | TEMPERATURE: 98 F | HEART RATE: 93 BPM | SYSTOLIC BLOOD PRESSURE: 152 MMHG | RESPIRATION RATE: 16 BRPM | DIASTOLIC BLOOD PRESSURE: 99 MMHG | OXYGEN SATURATION: 99 % | WEIGHT: 179 LBS

## 2023-03-27 VITALS — DIASTOLIC BLOOD PRESSURE: 88 MMHG | SYSTOLIC BLOOD PRESSURE: 134 MMHG

## 2023-03-27 DIAGNOSIS — Z87.898 PERSONAL HISTORY OF OTHER SPECIFIED CONDITIONS: ICD-10-CM

## 2023-03-27 DIAGNOSIS — Z00.00 ENCOUNTER FOR GENERAL ADULT MEDICAL EXAMINATION WITHOUT ABNORMAL FINDINGS: ICD-10-CM

## 2023-03-27 DIAGNOSIS — R03.0 ELEVATED BLOOD-PRESSURE READING, W/OUT DIAGNOSIS OF HYPERTENSION: ICD-10-CM

## 2023-03-27 PROCEDURE — G0463: CPT

## 2023-03-27 RX ORDER — BLOOD-GLUCOSE METER
KIT MISCELLANEOUS
Qty: 1 | Refills: 0 | Status: ACTIVE | COMMUNITY
Start: 2023-03-27 | End: 1900-01-01

## 2023-03-27 RX ORDER — HYDROCHLOROTHIAZIDE 25 MG/1
25 TABLET ORAL
Refills: 0 | Status: DISCONTINUED | COMMUNITY
End: 2023-03-27

## 2023-03-27 NOTE — HEALTH RISK ASSESSMENT
[No] : No [No falls in past year] : Patient reported no falls in the past year [0] : 2) Feeling down, depressed, or hopeless: Not at all (0) [PHQ-2 Negative - No further assessment needed] : PHQ-2 Negative - No further assessment needed [Never] : Never

## 2023-03-27 NOTE — PHYSICAL EXAM
[Normal] : normal rate, regular rhythm, normal S1 and S2 and no murmur heard [Pedal Pulses Present] : the pedal pulses are present [No Edema] : there was no peripheral edema [Soft] : abdomen soft [Non Tender] : non-tender [Non-distended] : non-distended [No CVA Tenderness] : no CVA  tenderness [No Spinal Tenderness] : no spinal tenderness [No Rash] : no rash [Coordination Grossly Intact] : coordination grossly intact [No Focal Deficits] : no focal deficits

## 2023-03-28 DIAGNOSIS — R03.0 ELEVATED BLOOD-PRESSURE READING, WITHOUT DIAGNOSIS OF HYPERTENSION: ICD-10-CM

## 2023-03-28 DIAGNOSIS — Z87.898 PERSONAL HISTORY OF OTHER SPECIFIED CONDITIONS: ICD-10-CM

## 2023-03-28 NOTE — HISTORY OF PRESENT ILLNESS
[Spouse] : spouse [FreeTextEntry8] : 47 year old with PMH of elevated presents today for follow of ED visit for epistaxis. Pt states he has not had any episodes of nose bleeding since March 18th. Pt denies any fever, chills, headache, chest pain and SOB at this time,

## 2023-03-28 NOTE — END OF VISIT
[] : Resident [FreeTextEntry3] : During discussion of this patient I advised resident to discuss taking one med over taking 2 meds with patient and if patient wants to take only one, it is going to increase compliance, and patient favored the one med and we increased amlodipine to 10 mg daily, patient to rtc i 3-7 days for f/u.

## 2023-03-30 ENCOUNTER — APPOINTMENT (OUTPATIENT)
Dept: FAMILY MEDICINE | Facility: HOSPITAL | Age: 48
End: 2023-03-30

## 2023-03-30 ENCOUNTER — OUTPATIENT (OUTPATIENT)
Dept: OUTPATIENT SERVICES | Facility: HOSPITAL | Age: 48
LOS: 1 days | End: 2023-03-30
Payer: SELF-PAY

## 2023-03-30 VITALS
OXYGEN SATURATION: 99 % | TEMPERATURE: 98 F | DIASTOLIC BLOOD PRESSURE: 99 MMHG | SYSTOLIC BLOOD PRESSURE: 162 MMHG | WEIGHT: 178 LBS | HEART RATE: 87 BPM | BODY MASS INDEX: 29.62 KG/M2 | RESPIRATION RATE: 17 BRPM

## 2023-03-30 VITALS
HEART RATE: 93 BPM | SYSTOLIC BLOOD PRESSURE: 130 MMHG | RESPIRATION RATE: 18 BRPM | DIASTOLIC BLOOD PRESSURE: 89 MMHG | OXYGEN SATURATION: 99 %

## 2023-03-30 DIAGNOSIS — Z00.00 ENCOUNTER FOR GENERAL ADULT MEDICAL EXAMINATION WITHOUT ABNORMAL FINDINGS: ICD-10-CM

## 2023-03-30 PROCEDURE — G0463: CPT

## 2023-03-30 NOTE — HISTORY OF PRESENT ILLNESS
[FreeTextEntry1] : BP Check [de-identified] : 47 year old male with HTN presents today for follow up BP. Pt had BP of 220/108 in the ER 1.5 weeks ago. Today pt states he had a headache when he woke up this morning but that has now resolved. Pt denies any chest pain, SOB, abdominal pain or vision problems at this time.

## 2023-03-30 NOTE — PHYSICAL EXAM
[PERRL] : pupils equal round and reactive to light [EOMI] : extraocular movements intact [No JVD] : no jugular venous distention [Normal] : normal rate, regular rhythm, normal S1 and S2 and no murmur heard [Pedal Pulses Present] : the pedal pulses are present [No Edema] : there was no peripheral edema [Soft] : abdomen soft [Non Tender] : non-tender [Non-distended] : non-distended [No Joint Swelling] : no joint swelling [No Rash] : no rash [Coordination Grossly Intact] : coordination grossly intact [No Focal Deficits] : no focal deficits

## 2023-03-30 NOTE — COUNSELING
[Potential consequences of obesity discussed] : Potential consequences of obesity discussed [Benefits of weight loss discussed] : Benefits of weight loss discussed [Encouraged to maintain food diary] : Encouraged to maintain food diary [Encouraged to increase physical activity] : Encouraged to increase physical activity [Encouraged to use exercise tracking device] : Encouraged to use exercise tracking device [None] : None [Good understanding] : Patient has a good understanding of lifestyle changes and steps needed to achieve self management goal

## 2023-03-30 NOTE — END OF VISIT
[] : Resident [FreeTextEntry3] : patient's HA is not new and as per patient he gets it once in a while and Tylenol alleviate the pain. he is currently taking amlodipine 5 mg BID and bp is controlled.

## 2023-04-03 DIAGNOSIS — I10 ESSENTIAL (PRIMARY) HYPERTENSION: ICD-10-CM

## 2023-04-12 ENCOUNTER — OUTPATIENT (OUTPATIENT)
Dept: OUTPATIENT SERVICES | Facility: HOSPITAL | Age: 48
LOS: 1 days | End: 2023-04-12
Payer: COMMERCIAL

## 2023-04-12 ENCOUNTER — OUTPATIENT (OUTPATIENT)
Dept: OUTPATIENT SERVICES | Facility: HOSPITAL | Age: 48
LOS: 1 days | End: 2023-04-12
Payer: SELF-PAY

## 2023-04-12 ENCOUNTER — APPOINTMENT (OUTPATIENT)
Dept: FAMILY MEDICINE | Facility: HOSPITAL | Age: 48
End: 2023-04-12

## 2023-04-12 VITALS — SYSTOLIC BLOOD PRESSURE: 146 MMHG | DIASTOLIC BLOOD PRESSURE: 88 MMHG

## 2023-04-12 VITALS
OXYGEN SATURATION: 10 % | HEART RATE: 99 BPM | SYSTOLIC BLOOD PRESSURE: 149 MMHG | DIASTOLIC BLOOD PRESSURE: 94 MMHG | RESPIRATION RATE: 18 BRPM | TEMPERATURE: 98 F | WEIGHT: 177 LBS | BODY MASS INDEX: 29.45 KG/M2

## 2023-04-12 DIAGNOSIS — Z00.00 ENCOUNTER FOR GENERAL ADULT MEDICAL EXAMINATION WITHOUT ABNORMAL FINDINGS: ICD-10-CM

## 2023-04-12 DIAGNOSIS — Z12.11 ENCOUNTER FOR SCREENING FOR MALIGNANT NEOPLASM OF COLON: ICD-10-CM

## 2023-04-12 PROCEDURE — G0463: CPT

## 2023-04-12 PROCEDURE — 82274 ASSAY TEST FOR BLOOD FECAL: CPT

## 2023-04-13 NOTE — PHYSICAL EXAM
[No Acute Distress] : no acute distress [Well Nourished] : well nourished [Well Developed] : well developed [Well-Appearing] : well-appearing [Normal Sclera/Conjunctiva] : normal sclera/conjunctiva [EOMI] : extraocular movements intact [Normal Outer Ear/Nose] : the outer ears and nose were normal in appearance [Normal Oropharynx] : the oropharynx was normal [Supple] : supple [No Respiratory Distress] : no respiratory distress  [No Accessory Muscle Use] : no accessory muscle use [Clear to Auscultation] : lungs were clear to auscultation bilaterally [Normal Rate] : normal rate  [Regular Rhythm] : with a regular rhythm [Normal S1, S2] : normal S1 and S2 [No Murmur] : no murmur heard [Soft] : abdomen soft [Non Tender] : non-tender [Non-distended] : non-distended [No Masses] : no abdominal mass palpated [No HSM] : no HSM [Normal Bowel Sounds] : normal bowel sounds [No CVA Tenderness] : no CVA  tenderness [No Spinal Tenderness] : no spinal tenderness [No Joint Swelling] : no joint swelling [Grossly Normal Strength/Tone] : grossly normal strength/tone [No Rash] : no rash [Coordination Grossly Intact] : coordination grossly intact [No Focal Deficits] : no focal deficits [Normal Gait] : normal gait [Deep Tendon Reflexes (DTR)] : deep tendon reflexes were 2+ and symmetric [Normal Affect] : the affect was normal [Alert and Oriented x3] : oriented to person, place, and time [Normal Insight/Judgement] : insight and judgment were intact

## 2023-04-14 DIAGNOSIS — I10 ESSENTIAL (PRIMARY) HYPERTENSION: ICD-10-CM

## 2023-04-14 DIAGNOSIS — G47.33 OBSTRUCTIVE SLEEP APNEA (ADULT) (PEDIATRIC): ICD-10-CM

## 2023-04-14 DIAGNOSIS — Z12.11 ENCOUNTER FOR SCREENING FOR MALIGNANT NEOPLASM OF COLON: ICD-10-CM

## 2023-04-17 NOTE — HISTORY OF PRESENT ILLNESS
[FreeTextEntry1] : BP check [de-identified] : 47 year old male with HTN presents today for follow up BP. Last visit was on 3/30/23, for ED followup and BP check. His BP in the ED at that time was 221/128 in the ED and 130/89 in the office. Patient is on Amlodipine 10 mg. \par \par Today, BP is 149/94. Reports that he normally takes BP at home and usually 115-118/84-86. States that he has attempted to reduce meals with high salt. States that he has been taking amlodipine medication daily but has taken it at different times depending on the day. States to have some worry when enters physician's office. Significant other reports that patient tends to snore at night and gasp for air. Patient states that he feels daytime fatigue\par \par Of note, patient would like to review lab results of recent ED admission with hypertensive emergency with nose bleed. Troponins WNL. Labs unremarkable except for potassium that was at 3.2 when in ED on 3/18/23. Patient states that given medication other than amlodipine at that time but does not remember if it was potassium tablets. States he thinks he received antibiotics at that time.

## 2023-04-17 NOTE — HISTORY OF PRESENT ILLNESS
[FreeTextEntry1] : BP check [de-identified] : 47 year old male with HTN presents today for follow up BP. Last visit was on 3/30/23, for ED followup and BP check. His BP in the ED at that time was 221/128 in the ED and 130/89 in the office. Patient is on Amlodipine 10 mg. \par \par Today, BP is 149/94. Reports that he normally takes BP at home and usually 115-118/84-86. States that he has attempted to reduce meals with high salt. States that he has been taking amlodipine medication daily but has taken it at different times depending on the day. States to have some worry when enters physician's office. Significant other reports that patient tends to snore at night and gasp for air. Patient states that he feels daytime fatigue\par \par Of note, patient would like to review lab results of recent ED admission with hypertensive emergency with nose bleed. Troponins WNL. Labs unremarkable except for potassium that was at 3.2 when in ED on 3/18/23. Patient states that given medication other than amlodipine at that time but does not remember if it was potassium tablets. States he thinks he received antibiotics at that time.

## 2023-04-20 LAB — HEMOCCULT STL QL IA: NEGATIVE

## 2023-05-22 ENCOUNTER — OUTPATIENT (OUTPATIENT)
Dept: OUTPATIENT SERVICES | Facility: HOSPITAL | Age: 48
LOS: 1 days | End: 2023-05-22

## 2023-05-22 ENCOUNTER — APPOINTMENT (OUTPATIENT)
Dept: FAMILY MEDICINE | Facility: HOSPITAL | Age: 48
End: 2023-05-22

## 2023-05-22 VITALS
RESPIRATION RATE: 16 BRPM | WEIGHT: 174 LBS | HEART RATE: 80 BPM | DIASTOLIC BLOOD PRESSURE: 90 MMHG | BODY MASS INDEX: 28.96 KG/M2 | SYSTOLIC BLOOD PRESSURE: 146 MMHG | OXYGEN SATURATION: 96 % | TEMPERATURE: 98.6 F

## 2023-05-22 DIAGNOSIS — Z00.00 ENCOUNTER FOR GENERAL ADULT MEDICAL EXAMINATION W/OUT ABNORMAL FINDINGS: ICD-10-CM

## 2023-05-22 DIAGNOSIS — Z00.00 ENCOUNTER FOR GENERAL ADULT MEDICAL EXAMINATION WITHOUT ABNORMAL FINDINGS: ICD-10-CM

## 2023-05-23 PROBLEM — Z00.00 ENCOUNTER FOR PREVENTIVE HEALTH EXAMINATION: Status: ACTIVE | Noted: 2023-03-21

## 2023-05-23 NOTE — COUNSELING
[Yes] : Risk of tobacco use and health benefits of smoking cessation discussed: Yes [Potential consequences of obesity discussed] : Potential consequences of obesity discussed [Benefits of weight loss discussed] : Benefits of weight loss discussed [Encouraged to maintain food diary] : Encouraged to maintain food diary [Encouraged to increase physical activity] : Encouraged to increase physical activity

## 2023-05-23 NOTE — PHYSICAL EXAM
[No Acute Distress] : no acute distress [Well Nourished] : well nourished [Normal Sclera/Conjunctiva] : normal sclera/conjunctiva [EOMI] : extraocular movements intact [Normal Outer Ear/Nose] : the outer ears and nose were normal in appearance [Thyroid Normal, No Nodules] : the thyroid was normal and there were no nodules present [No Respiratory Distress] : no respiratory distress  [No Accessory Muscle Use] : no accessory muscle use [Clear to Auscultation] : lungs were clear to auscultation bilaterally [Normal Rate] : normal rate  [Regular Rhythm] : with a regular rhythm [Normal S1, S2] : normal S1 and S2 [No Varicosities] : no varicosities [No Edema] : there was no peripheral edema [Soft] : abdomen soft [Non Tender] : non-tender [Normal Bowel Sounds] : normal bowel sounds [No CVA Tenderness] : no CVA  tenderness [No Spinal Tenderness] : no spinal tenderness [No Rash] : no rash

## 2023-05-27 NOTE — REVIEW OF SYSTEMS
[Fever] : no fever [Chills] : no chills [Fatigue] : no fatigue [Discharge] : no discharge [Vision Problems] : no vision problems [Earache] : no earache [Nasal Discharge] : no nasal discharge [Sore Throat] : no sore throat [Chest Pain] : no chest pain [Palpitations] : no palpitations [Shortness Of Breath] : no shortness of breath [Wheezing] : no wheezing [Cough] : no cough [Abdominal Pain] : no abdominal pain [Nausea] : no nausea [Vomiting] : no vomiting [Dysuria] : no dysuria [Hematuria] : no hematuria [Joint Pain] : no joint pain [Back Pain] : no back pain [Itching] : no itching [Skin Rash] : no skin rash [Headache] : no headache [Dizziness] : no dizziness

## 2023-05-27 NOTE — HISTORY OF PRESENT ILLNESS
[FreeTextEntry1] : BP check  [de-identified] :  ID: [id number]\par Patient is a 46 y/o M with a PMH of HTN and ARIAN presenting for BP check. Was last seen in April and BP was 146/88. Amlodipine was recently increased from 5 mg to 10 mg. BP today 142/91 with HR 76. Currently he denies having headaches, nosebleeds, shortness of breath, vision problems, irregular heartbeat, or dizziness. Today he has pictures of the readings he took at home. BP ranges at home were 120s to 130s systolic with diastolic values mainly in the 80s. \par \par No other acute complaints today. Reports feeling well overall. \par

## 2023-05-27 NOTE — HEALTH RISK ASSESSMENT
[Good] : ~his/her~  mood as  good [0] : 2) Feeling down, depressed, or hopeless: Not at all (0) [Unemployed] : unemployed [Feels Safe at Home] : Feels safe at home [Fully functional (bathing, dressing, toileting, transferring, walking, feeding)] : Fully functional (bathing, dressing, toileting, transferring, walking, feeding) [Fully functional (using the telephone, shopping, preparing meals, housekeeping, doing laundry, using] : Fully functional and needs no help or supervision to perform IADLs (using the telephone, shopping, preparing meals, housekeeping, doing laundry, using transportation, managing medications and managing finances) [Former] : Former [> 15 Years] : > 15 Years [de-identified] : used to work at [email or web address] - currently looking for work [de-identified] : The last time he smoked was over twenty years ago

## 2023-06-22 ENCOUNTER — APPOINTMENT (OUTPATIENT)
Dept: FAMILY MEDICINE | Facility: HOSPITAL | Age: 48
End: 2023-06-22

## 2023-06-22 ENCOUNTER — OUTPATIENT (OUTPATIENT)
Dept: OUTPATIENT SERVICES | Facility: HOSPITAL | Age: 48
LOS: 1 days | End: 2023-06-22
Payer: SELF-PAY

## 2023-06-22 VITALS
RESPIRATION RATE: 17 BRPM | TEMPERATURE: 98.7 F | WEIGHT: 176 LBS | DIASTOLIC BLOOD PRESSURE: 102 MMHG | HEART RATE: 95 BPM | SYSTOLIC BLOOD PRESSURE: 152 MMHG | OXYGEN SATURATION: 98 % | BODY MASS INDEX: 29.29 KG/M2

## 2023-06-22 DIAGNOSIS — I10 ESSENTIAL (PRIMARY) HYPERTENSION: ICD-10-CM

## 2023-06-22 DIAGNOSIS — Z00.00 ENCOUNTER FOR GENERAL ADULT MEDICAL EXAMINATION WITHOUT ABNORMAL FINDINGS: ICD-10-CM

## 2023-06-22 DIAGNOSIS — G47.33 OBSTRUCTIVE SLEEP APNEA (ADULT) (PEDIATRIC): ICD-10-CM

## 2023-06-22 PROCEDURE — 83036 HEMOGLOBIN GLYCOSYLATED A1C: CPT

## 2023-06-22 PROCEDURE — 36415 COLL VENOUS BLD VENIPUNCTURE: CPT

## 2023-06-22 PROCEDURE — 80053 COMPREHEN METABOLIC PANEL: CPT

## 2023-06-22 PROCEDURE — G0463: CPT

## 2023-06-22 PROCEDURE — 80061 LIPID PANEL: CPT

## 2023-06-22 NOTE — PHYSICAL EXAM
[Normal Sclera/Conjunctiva] : normal sclera/conjunctiva [PERRL] : pupils equal round and reactive to light [Normal] : normal rate, regular rhythm, normal S1 and S2 and no murmur heard [Pedal Pulses Present] : the pedal pulses are present [No Edema] : there was no peripheral edema [Non Tender] : non-tender [Non-distended] : non-distended [No CVA Tenderness] : no CVA  tenderness [No Spinal Tenderness] : no spinal tenderness [No Joint Swelling] : no joint swelling [No Rash] : no rash

## 2023-06-24 NOTE — HISTORY OF PRESENT ILLNESS
[FreeTextEntry1] : BP check [de-identified] : 47 year old male with history of HTN and ARIAN presents for BP check, Pt states he checks his blood pressure at home and they range from 130-140s systolic to 80s-90s diastolic. Pt endorses he is compliant with his medications as prescribed. Pt states he has not yet gone for his sleep study but he will get it scheduled. Today pt denies headache, chest pain, SOB. abdominal pain at this time.

## 2023-07-12 LAB
ALBUMIN SERPL ELPH-MCNC: 4.6 G/DL
ALP BLD-CCNC: 116 U/L
ALT SERPL-CCNC: 26 U/L
ANION GAP SERPL CALC-SCNC: 13 MMOL/L
AST SERPL-CCNC: 23 U/L
BILIRUB SERPL-MCNC: 0.4 MG/DL
BUN SERPL-MCNC: 9 MG/DL
CALCIUM SERPL-MCNC: 9.4 MG/DL
CHLORIDE SERPL-SCNC: 105 MMOL/L
CHOLEST SERPL-MCNC: 229 MG/DL
CO2 SERPL-SCNC: 24 MMOL/L
CREAT SERPL-MCNC: 1.02 MG/DL
EGFR: 91 ML/MIN/1.73M2
ESTIMATED AVERAGE GLUCOSE: 105 MG/DL
GLUCOSE SERPL-MCNC: 113 MG/DL
HBA1C MFR BLD HPLC: 5.3 %
HDLC SERPL-MCNC: 54 MG/DL
LDLC SERPL CALC-MCNC: 154 MG/DL
NONHDLC SERPL-MCNC: 175 MG/DL
POTASSIUM SERPL-SCNC: 3.7 MMOL/L
PROT SERPL-MCNC: 7.4 G/DL
SODIUM SERPL-SCNC: 142 MMOL/L
TRIGL SERPL-MCNC: 103 MG/DL

## 2023-09-18 ENCOUNTER — APPOINTMENT (OUTPATIENT)
Dept: FAMILY MEDICINE | Facility: HOSPITAL | Age: 48
End: 2023-09-18

## 2023-09-18 ENCOUNTER — OUTPATIENT (OUTPATIENT)
Dept: OUTPATIENT SERVICES | Facility: HOSPITAL | Age: 48
LOS: 1 days | End: 2023-09-18
Payer: SELF-PAY

## 2023-09-18 VITALS — SYSTOLIC BLOOD PRESSURE: 139 MMHG | DIASTOLIC BLOOD PRESSURE: 91 MMHG

## 2023-09-18 VITALS
RESPIRATION RATE: 14 BRPM | HEART RATE: 73 BPM | BODY MASS INDEX: 28.62 KG/M2 | SYSTOLIC BLOOD PRESSURE: 160 MMHG | OXYGEN SATURATION: 97 % | DIASTOLIC BLOOD PRESSURE: 107 MMHG | WEIGHT: 172 LBS | TEMPERATURE: 98.2 F

## 2023-09-18 DIAGNOSIS — Z00.00 ENCOUNTER FOR GENERAL ADULT MEDICAL EXAMINATION WITHOUT ABNORMAL FINDINGS: ICD-10-CM

## 2023-09-18 PROCEDURE — G0463: CPT

## 2023-09-19 DIAGNOSIS — I10 ESSENTIAL (PRIMARY) HYPERTENSION: ICD-10-CM

## 2023-10-25 ENCOUNTER — OUTPATIENT (OUTPATIENT)
Dept: OUTPATIENT SERVICES | Facility: HOSPITAL | Age: 48
LOS: 1 days | End: 2023-10-25
Payer: SELF-PAY

## 2023-10-25 ENCOUNTER — APPOINTMENT (OUTPATIENT)
Dept: FAMILY MEDICINE | Facility: HOSPITAL | Age: 48
End: 2023-10-25

## 2023-10-25 VITALS
RESPIRATION RATE: 18 BRPM | OXYGEN SATURATION: 100 % | SYSTOLIC BLOOD PRESSURE: 155 MMHG | DIASTOLIC BLOOD PRESSURE: 93 MMHG | HEART RATE: 98 BPM | WEIGHT: 188 LBS | BODY MASS INDEX: 31.29 KG/M2 | TEMPERATURE: 97.9 F

## 2023-10-25 VITALS — SYSTOLIC BLOOD PRESSURE: 154 MMHG | DIASTOLIC BLOOD PRESSURE: 104 MMHG

## 2023-10-25 VITALS — SYSTOLIC BLOOD PRESSURE: 150 MMHG | DIASTOLIC BLOOD PRESSURE: 95 MMHG

## 2023-10-25 DIAGNOSIS — I10 ESSENTIAL (PRIMARY) HYPERTENSION: ICD-10-CM

## 2023-10-25 DIAGNOSIS — Z00.00 ENCOUNTER FOR GENERAL ADULT MEDICAL EXAMINATION WITHOUT ABNORMAL FINDINGS: ICD-10-CM

## 2023-10-25 DIAGNOSIS — G47.33 OBSTRUCTIVE SLEEP APNEA (ADULT) (PEDIATRIC): ICD-10-CM

## 2023-10-25 PROCEDURE — G0463: CPT

## 2023-11-15 ENCOUNTER — OUTPATIENT (OUTPATIENT)
Dept: OUTPATIENT SERVICES | Facility: HOSPITAL | Age: 48
LOS: 1 days | End: 2023-11-15
Payer: SELF-PAY

## 2023-11-15 ENCOUNTER — APPOINTMENT (OUTPATIENT)
Dept: FAMILY MEDICINE | Facility: HOSPITAL | Age: 48
End: 2023-11-15

## 2023-11-15 VITALS — DIASTOLIC BLOOD PRESSURE: 91 MMHG | SYSTOLIC BLOOD PRESSURE: 132 MMHG

## 2023-11-15 VITALS
RESPIRATION RATE: 17 BRPM | SYSTOLIC BLOOD PRESSURE: 153 MMHG | OXYGEN SATURATION: 99 % | WEIGHT: 188 LBS | HEART RATE: 86 BPM | DIASTOLIC BLOOD PRESSURE: 105 MMHG | BODY MASS INDEX: 31.29 KG/M2 | TEMPERATURE: 98.1 F

## 2023-11-15 DIAGNOSIS — I10 ESSENTIAL (PRIMARY) HYPERTENSION: ICD-10-CM

## 2023-11-15 DIAGNOSIS — Z00.00 ENCOUNTER FOR GENERAL ADULT MEDICAL EXAMINATION WITHOUT ABNORMAL FINDINGS: ICD-10-CM

## 2023-11-15 PROCEDURE — G0463: CPT

## 2024-01-03 ENCOUNTER — APPOINTMENT (OUTPATIENT)
Dept: SLEEP CENTER | Facility: CLINIC | Age: 49
End: 2024-01-03

## 2024-03-13 ENCOUNTER — OUTPATIENT (OUTPATIENT)
Dept: OUTPATIENT SERVICES | Facility: HOSPITAL | Age: 49
LOS: 1 days | End: 2024-03-13
Payer: SELF-PAY

## 2024-03-13 ENCOUNTER — APPOINTMENT (OUTPATIENT)
Dept: FAMILY MEDICINE | Facility: HOSPITAL | Age: 49
End: 2024-03-13

## 2024-03-13 VITALS
BODY MASS INDEX: 32.45 KG/M2 | TEMPERATURE: 97.7 F | RESPIRATION RATE: 14 BRPM | DIASTOLIC BLOOD PRESSURE: 87 MMHG | OXYGEN SATURATION: 98 % | HEART RATE: 79 BPM | WEIGHT: 195 LBS | SYSTOLIC BLOOD PRESSURE: 124 MMHG

## 2024-03-13 DIAGNOSIS — I10 ESSENTIAL (PRIMARY) HYPERTENSION: ICD-10-CM

## 2024-03-13 DIAGNOSIS — Z23 ENCOUNTER FOR IMMUNIZATION: ICD-10-CM

## 2024-03-13 DIAGNOSIS — Z29.9 ENCOUNTER FOR PROPHYLACTIC MEASURES, UNSPECIFIED: ICD-10-CM

## 2024-03-13 DIAGNOSIS — Z00.00 ENCOUNTER FOR GENERAL ADULT MEDICAL EXAMINATION WITHOUT ABNORMAL FINDINGS: ICD-10-CM

## 2024-03-13 PROCEDURE — G0463: CPT

## 2024-03-13 RX ORDER — LISINOPRIL 5 MG/1
5 TABLET ORAL DAILY
Qty: 3 | Refills: 2 | Status: ACTIVE | COMMUNITY
Start: 2023-10-25 | End: 1900-01-01

## 2024-03-13 RX ORDER — AMLODIPINE BESYLATE 10 MG/1
10 TABLET ORAL DAILY
Qty: 1 | Refills: 3 | Status: DISCONTINUED | COMMUNITY
End: 2024-03-13

## 2024-03-17 NOTE — HISTORY OF PRESENT ILLNESS
[FreeTextEntry1] : BP [de-identified] : 48M h/o HTN, ARIAN , here for BP check. Last visit 11/15/2023, taking Amlodipine 10mg and Lisinopril 5mg as per note. Today patient states he was taken off Amlodipine 10mg and has been taking Lisinopril 5mg daily only. Taking BPs at home, 120s/80s sometimes 110s/70s. Denies dizziness, headache, blurry vision, chest pain, palpitations.

## 2025-02-19 ENCOUNTER — OUTPATIENT (OUTPATIENT)
Dept: OUTPATIENT SERVICES | Facility: HOSPITAL | Age: 50
LOS: 1 days | End: 2025-02-19
Payer: COMMERCIAL

## 2025-02-19 ENCOUNTER — OUTPATIENT (OUTPATIENT)
Dept: OUTPATIENT SERVICES | Facility: HOSPITAL | Age: 50
LOS: 1 days | End: 2025-02-19
Payer: SELF-PAY

## 2025-02-19 ENCOUNTER — APPOINTMENT (OUTPATIENT)
Age: 50
End: 2025-02-19

## 2025-02-19 VITALS
WEIGHT: 183 LBS | TEMPERATURE: 97.7 F | RESPIRATION RATE: 14 BRPM | SYSTOLIC BLOOD PRESSURE: 144 MMHG | BODY MASS INDEX: 30.45 KG/M2 | OXYGEN SATURATION: 97 % | HEART RATE: 67 BPM | DIASTOLIC BLOOD PRESSURE: 82 MMHG

## 2025-02-19 DIAGNOSIS — Z78.9 OTHER SPECIFIED HEALTH STATUS: ICD-10-CM

## 2025-02-19 DIAGNOSIS — Z82.49 FAMILY HISTORY OF ISCHEMIC HEART DISEASE AND OTHER DISEASES OF THE CIRCULATORY SYSTEM: ICD-10-CM

## 2025-02-19 DIAGNOSIS — Z00.00 ENCOUNTER FOR GENERAL ADULT MEDICAL EXAMINATION WITHOUT ABNORMAL FINDINGS: ICD-10-CM

## 2025-02-19 DIAGNOSIS — Z00.00 ENCOUNTER FOR GENERAL ADULT MEDICAL EXAMINATION W/OUT ABNORMAL FINDINGS: ICD-10-CM

## 2025-02-19 DIAGNOSIS — Z86.79 PERSONAL HISTORY OF OTHER DISEASES OF THE CIRCULATORY SYSTEM: ICD-10-CM

## 2025-02-19 DIAGNOSIS — Z12.11 ENCOUNTER FOR SCREENING FOR MALIGNANT NEOPLASM OF COLON: ICD-10-CM

## 2025-02-19 PROCEDURE — G0463: CPT

## 2025-02-25 ENCOUNTER — NON-APPOINTMENT (OUTPATIENT)
Age: 50
End: 2025-02-25

## 2025-02-25 PROBLEM — Z86.79 HISTORY OF HYPERTENSION: Status: RESOLVED | Noted: 2025-02-19 | Resolved: 2025-02-25

## 2025-02-25 PROBLEM — Z00.00 PHYSICAL EXAM, ANNUAL: Status: ACTIVE | Noted: 2025-02-25

## 2025-02-25 LAB
ALBUMIN SERPL ELPH-MCNC: 4.6 G/DL
ALP BLD-CCNC: 85 U/L
ALT SERPL-CCNC: 23 U/L
ANION GAP SERPL CALC-SCNC: 11 MMOL/L
AST SERPL-CCNC: 27 U/L
BASOPHILS # BLD AUTO: 0.04 K/UL
BASOPHILS NFR BLD AUTO: 0.5 %
BILIRUB SERPL-MCNC: 0.6 MG/DL
BUN SERPL-MCNC: 13 MG/DL
CALCIUM SERPL-MCNC: 9.7 MG/DL
CHLORIDE SERPL-SCNC: 103 MMOL/L
CHOLEST SERPL-MCNC: 201 MG/DL
CO2 SERPL-SCNC: 25 MMOL/L
CREAT SERPL-MCNC: 1.1 MG/DL
EGFR: 82 ML/MIN/1.73M2
EOSINOPHIL # BLD AUTO: 0.05 K/UL
EOSINOPHIL NFR BLD AUTO: 0.7 %
ESTIMATED AVERAGE GLUCOSE: 108 MG/DL
GLUCOSE SERPL-MCNC: 100 MG/DL
HBA1C MFR BLD HPLC: 5.4 %
HCT VFR BLD CALC: 44.2 %
HDLC SERPL-MCNC: 38 MG/DL
HGB BLD-MCNC: 15.6 G/DL
IMM GRANULOCYTES NFR BLD AUTO: 0.3 %
LDLC SERPL CALC-MCNC: 136 MG/DL
LYMPHOCYTES # BLD AUTO: 2.52 K/UL
LYMPHOCYTES NFR BLD AUTO: 34.3 %
MAN DIFF?: NORMAL
MCHC RBC-ENTMCNC: 31.8 PG
MCHC RBC-ENTMCNC: 35.3 G/DL
MCV RBC AUTO: 90.2 FL
MONOCYTES # BLD AUTO: 0.62 K/UL
MONOCYTES NFR BLD AUTO: 8.4 %
NEUTROPHILS # BLD AUTO: 4.09 K/UL
NEUTROPHILS NFR BLD AUTO: 55.8 %
NONHDLC SERPL-MCNC: 162 MG/DL
PLATELET # BLD AUTO: 287 K/UL
POTASSIUM SERPL-SCNC: 4.3 MMOL/L
PROT SERPL-MCNC: 8 G/DL
RBC # BLD: 4.9 M/UL
RBC # FLD: 12.1 %
SODIUM SERPL-SCNC: 139 MMOL/L
TRIGL SERPL-MCNC: 144 MG/DL
WBC # FLD AUTO: 7.34 K/UL

## 2025-03-01 PROCEDURE — 85025 COMPLETE CBC W/AUTO DIFF WBC: CPT

## 2025-03-01 PROCEDURE — 80061 LIPID PANEL: CPT

## 2025-03-01 PROCEDURE — G0463: CPT

## 2025-03-01 PROCEDURE — 80053 COMPREHEN METABOLIC PANEL: CPT

## 2025-03-01 PROCEDURE — 83036 HEMOGLOBIN GLYCOSYLATED A1C: CPT

## 2025-03-01 PROCEDURE — 82274 ASSAY TEST FOR BLOOD FECAL: CPT

## 2025-03-02 LAB — HEMOCCULT STL QL IA: NEGATIVE

## 2025-03-04 ENCOUNTER — NON-APPOINTMENT (OUTPATIENT)
Age: 50
End: 2025-03-04

## 2025-06-24 ENCOUNTER — RX RENEWAL (OUTPATIENT)
Age: 50
End: 2025-06-24

## 2025-07-30 ENCOUNTER — APPOINTMENT (OUTPATIENT)
Age: 50
End: 2025-07-30

## 2025-07-30 ENCOUNTER — OUTPATIENT (OUTPATIENT)
Dept: OUTPATIENT SERVICES | Facility: HOSPITAL | Age: 50
LOS: 1 days | End: 2025-07-30
Payer: SELF-PAY

## 2025-07-30 VITALS — SYSTOLIC BLOOD PRESSURE: 135 MMHG | DIASTOLIC BLOOD PRESSURE: 82 MMHG

## 2025-07-30 VITALS
SYSTOLIC BLOOD PRESSURE: 153 MMHG | DIASTOLIC BLOOD PRESSURE: 91 MMHG | BODY MASS INDEX: 30.45 KG/M2 | RESPIRATION RATE: 16 BRPM | OXYGEN SATURATION: 97 % | WEIGHT: 183 LBS | HEART RATE: 92 BPM | TEMPERATURE: 97.6 F

## 2025-07-30 DIAGNOSIS — G47.33 OBSTRUCTIVE SLEEP APNEA (ADULT) (PEDIATRIC): ICD-10-CM

## 2025-07-30 DIAGNOSIS — I10 ESSENTIAL (PRIMARY) HYPERTENSION: ICD-10-CM

## 2025-07-30 DIAGNOSIS — Z00.00 ENCOUNTER FOR GENERAL ADULT MEDICAL EXAMINATION WITHOUT ABNORMAL FINDINGS: ICD-10-CM

## 2025-07-30 DIAGNOSIS — E66.9 OBESITY, UNSPECIFIED: ICD-10-CM

## 2025-07-30 PROCEDURE — G0463: CPT

## 2025-09-03 ENCOUNTER — OUTPATIENT (OUTPATIENT)
Dept: OUTPATIENT SERVICES | Facility: HOSPITAL | Age: 50
LOS: 1 days | End: 2025-09-03
Payer: SELF-PAY

## 2025-09-03 ENCOUNTER — APPOINTMENT (OUTPATIENT)
Age: 50
End: 2025-09-03

## 2025-09-03 VITALS
WEIGHT: 185 LBS | SYSTOLIC BLOOD PRESSURE: 163 MMHG | OXYGEN SATURATION: 97 % | DIASTOLIC BLOOD PRESSURE: 98 MMHG | BODY MASS INDEX: 30.79 KG/M2 | RESPIRATION RATE: 16 BRPM | HEART RATE: 66 BPM | TEMPERATURE: 97.5 F

## 2025-09-03 VITALS — SYSTOLIC BLOOD PRESSURE: 160 MMHG | DIASTOLIC BLOOD PRESSURE: 91 MMHG

## 2025-09-03 DIAGNOSIS — Z00.00 ENCOUNTER FOR GENERAL ADULT MEDICAL EXAMINATION WITHOUT ABNORMAL FINDINGS: ICD-10-CM

## 2025-09-03 DIAGNOSIS — I10 ESSENTIAL (PRIMARY) HYPERTENSION: ICD-10-CM

## 2025-09-03 PROCEDURE — G0463: CPT

## 2025-09-17 ENCOUNTER — APPOINTMENT (OUTPATIENT)
Age: 50
End: 2025-09-17

## 2025-09-17 VITALS
SYSTOLIC BLOOD PRESSURE: 145 MMHG | RESPIRATION RATE: 16 BRPM | TEMPERATURE: 97.3 F | BODY MASS INDEX: 30.12 KG/M2 | OXYGEN SATURATION: 98 % | HEART RATE: 78 BPM | WEIGHT: 181 LBS | DIASTOLIC BLOOD PRESSURE: 81 MMHG

## 2025-09-17 VITALS — SYSTOLIC BLOOD PRESSURE: 147 MMHG | DIASTOLIC BLOOD PRESSURE: 89 MMHG

## 2025-09-17 DIAGNOSIS — I10 ESSENTIAL (PRIMARY) HYPERTENSION: ICD-10-CM

## 2025-09-17 DIAGNOSIS — Z23 ENCOUNTER FOR IMMUNIZATION: ICD-10-CM
